# Patient Record
Sex: MALE | Race: BLACK OR AFRICAN AMERICAN | URBAN - METROPOLITAN AREA
[De-identification: names, ages, dates, MRNs, and addresses within clinical notes are randomized per-mention and may not be internally consistent; named-entity substitution may affect disease eponyms.]

---

## 2018-02-16 ENCOUNTER — EMERGENCY (EMERGENCY)
Facility: HOSPITAL | Age: 23
LOS: 1 days | Discharge: HOME | End: 2018-02-16
Attending: EMERGENCY MEDICINE

## 2018-02-16 VITALS
SYSTOLIC BLOOD PRESSURE: 123 MMHG | RESPIRATION RATE: 18 BRPM | DIASTOLIC BLOOD PRESSURE: 73 MMHG | TEMPERATURE: 99 F | OXYGEN SATURATION: 100 % | HEART RATE: 70 BPM

## 2018-02-16 DIAGNOSIS — S02.609A FRACTURE OF MANDIBLE, UNSPECIFIED, INITIAL ENCOUNTER FOR CLOSED FRACTURE: ICD-10-CM

## 2018-02-16 DIAGNOSIS — R51 HEADACHE: ICD-10-CM

## 2018-02-16 DIAGNOSIS — M27.2 INFLAMMATORY CONDITIONS OF JAWS: ICD-10-CM

## 2018-02-16 DIAGNOSIS — K04.7 PERIAPICAL ABSCESS WITHOUT SINUS: ICD-10-CM

## 2018-02-16 NOTE — CONSULT NOTE ADULT - PROBLEM SELECTOR RECOMMENDATION 2
[facility] [place] SHOULD BE ADM[place]ED FOR IV ANTIBIOTICS, PATIENT REFUSED AND LEFT THE ROOM BUT AFTER HE RETURN AND AGREED TO BE TREATED.       panoramic xray showed a mandible right fracture and dental abscess #18. CT SCAN DONE AT [facility] RECOMMENDED PATIENT SHOULD BE ADMITTED FOR IV ANTIBIOTICS, PATIENT REFUSED AND LEFT THE ROOM BUT AFTER HE RETURN AND AGREED TO BE TREATED.

## 2018-02-16 NOTE — ED PROVIDER NOTE - NS ED ROS FT
CONST: + fever, chills  EYES: No pain, redness, drainage or visual changes.  ENT: + left sided facial swelling/pain  CARD: No chest pain, palpitations  RESP: No SOB, cough, hemoptysis. No hx of asthma or COPD  GI: No abdominal pain, N/V/D  SKIN: No rashes/redness  NEURO: No headache, dizziness, paresthesias or LOC

## 2018-02-16 NOTE — CONSULT NOTE ADULT - ASSESSMENT
OMFS RECOMMENDED PATIENT SHOULD BE ADMITTED FOR IV ANTIBIOTICS, PATIENT REFUSED AND LEFT THE ROOM BUT AFTER HE RETURN AND AGREED TO BE TREATED.       panoramic xray showed a mandible right fracture and dental abscess #18. CT SCAN DONE AT [facility]
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
## 2018-02-16 NOTE — ED PROVIDER NOTE - PHYSICAL EXAMINATION
CONST: Well appearing in NAD  EYES: Sclera and conjunctiva clear.   ENT: No nasal discharge. + left sided facial swelling. + left sided tender cervical LAD. Non erythematous pharynx. No pharyngeal edema noted.   CARD: Normal S1 S2; Normal rate and rhythm  RESP: Equal BS B/L, No wheezes, rhonchi or rales. No distress  MS: Pt moving all extremities  SKIN: Warm, dry, no acute rashes. No erythema noted  NEURO: A&Ox3, No focal deficits. EOM intact.
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
## 2018-02-17 ENCOUNTER — INPATIENT (INPATIENT)
Facility: HOSPITAL | Age: 23
LOS: 4 days | Discharge: HOME HEALTH | End: 2018-02-22
Attending: HOSPITALIST

## 2018-02-17 VITALS
TEMPERATURE: 102 F | DIASTOLIC BLOOD PRESSURE: 72 MMHG | RESPIRATION RATE: 20 BRPM | SYSTOLIC BLOOD PRESSURE: 125 MMHG | OXYGEN SATURATION: 98 % | HEART RATE: 113 BPM

## 2018-02-17 RX ORDER — AMPICILLIN SODIUM AND SULBACTAM SODIUM 250; 125 MG/ML; MG/ML
3 INJECTION, POWDER, FOR SUSPENSION INTRAMUSCULAR; INTRAVENOUS ONCE
Qty: 0 | Refills: 0 | Status: COMPLETED | OUTPATIENT
Start: 2018-02-17 | End: 2018-02-17

## 2018-02-17 RX ORDER — VANCOMYCIN HCL 1 G
1000 VIAL (EA) INTRAVENOUS ONCE
Qty: 0 | Refills: 0 | Status: COMPLETED | OUTPATIENT
Start: 2018-02-17 | End: 2018-02-18

## 2018-02-17 RX ORDER — ACETAMINOPHEN 500 MG
650 TABLET ORAL ONCE
Qty: 0 | Refills: 0 | Status: COMPLETED | OUTPATIENT
Start: 2018-02-17 | End: 2018-02-17

## 2018-02-17 RX ORDER — DEXAMETHASONE 0.5 MG/5ML
10 ELIXIR ORAL ONCE
Qty: 0 | Refills: 0 | Status: COMPLETED | OUTPATIENT
Start: 2018-02-17 | End: 2018-02-17

## 2018-02-17 RX ORDER — SODIUM CHLORIDE 9 MG/ML
500 INJECTION INTRAMUSCULAR; INTRAVENOUS; SUBCUTANEOUS
Qty: 0 | Refills: 0 | Status: COMPLETED | OUTPATIENT
Start: 2018-02-17 | End: 2018-02-18

## 2018-02-17 RX ADMIN — SODIUM CHLORIDE 2000 MILLILITER(S): 9 INJECTION INTRAMUSCULAR; INTRAVENOUS; SUBCUTANEOUS at 23:58

## 2018-02-18 LAB
ALBUMIN SERPL ELPH-MCNC: 3.8 G/DL — SIGNIFICANT CHANGE UP (ref 3–5.5)
ALP SERPL-CCNC: 68 U/L — SIGNIFICANT CHANGE UP (ref 30–115)
ALT FLD-CCNC: 28 U/L — SIGNIFICANT CHANGE UP (ref 0–41)
ANION GAP SERPL CALC-SCNC: 12 MMOL/L — SIGNIFICANT CHANGE UP (ref 7–14)
APPEARANCE UR: (no result)
APTT BLD: 36 SEC — SIGNIFICANT CHANGE UP (ref 27–39.2)
AST SERPL-CCNC: 29 U/L — SIGNIFICANT CHANGE UP (ref 0–41)
BASOPHILS # BLD AUTO: 0.02 K/UL — SIGNIFICANT CHANGE UP (ref 0–0.2)
BASOPHILS NFR BLD AUTO: 0.1 % — SIGNIFICANT CHANGE UP (ref 0–1)
BILIRUB SERPL-MCNC: 0.6 MG/DL — SIGNIFICANT CHANGE UP (ref 0.2–1.2)
BILIRUB UR-MCNC: NEGATIVE — SIGNIFICANT CHANGE UP
BUN SERPL-MCNC: 24 MG/DL — HIGH (ref 10–20)
CALCIUM SERPL-MCNC: 9.4 MG/DL — SIGNIFICANT CHANGE UP (ref 8.5–10.1)
CHLORIDE SERPL-SCNC: 105 MMOL/L — SIGNIFICANT CHANGE UP (ref 98–110)
CO2 SERPL-SCNC: 22 MMOL/L — SIGNIFICANT CHANGE UP (ref 17–32)
COLOR SPEC: YELLOW — SIGNIFICANT CHANGE UP
CREAT SERPL-MCNC: 0.8 MG/DL — SIGNIFICANT CHANGE UP (ref 0.7–1.5)
DIFF PNL FLD: NEGATIVE — SIGNIFICANT CHANGE UP
EOSINOPHIL # BLD AUTO: 0.01 K/UL — SIGNIFICANT CHANGE UP (ref 0–0.7)
EOSINOPHIL NFR BLD AUTO: 0.1 % — SIGNIFICANT CHANGE UP (ref 0–8)
GLUCOSE SERPL-MCNC: 93 MG/DL — SIGNIFICANT CHANGE UP (ref 70–110)
GLUCOSE UR QL: NEGATIVE — SIGNIFICANT CHANGE UP
HCT VFR BLD CALC: 32.2 % — LOW (ref 42–52)
HGB BLD-MCNC: 10.4 G/DL — LOW (ref 14–18)
IMM GRANULOCYTES NFR BLD AUTO: 0.4 % — HIGH (ref 0.1–0.3)
INR BLD: 1.21 RATIO — SIGNIFICANT CHANGE UP (ref 0.65–1.3)
KETONES UR-MCNC: NEGATIVE — SIGNIFICANT CHANGE UP
LACTATE SERPL-SCNC: 1 MMOL/L — SIGNIFICANT CHANGE UP (ref 0.5–2.2)
LEUKOCYTE ESTERASE UR-ACNC: NEGATIVE — SIGNIFICANT CHANGE UP
LYMPHOCYTES # BLD AUTO: 18.8 % — LOW (ref 20.5–51.1)
LYMPHOCYTES # BLD AUTO: 2.65 K/UL — SIGNIFICANT CHANGE UP (ref 1.2–3.4)
MCHC RBC-ENTMCNC: 22.9 PG — LOW (ref 27–31)
MCHC RBC-ENTMCNC: 32.3 G/DL — SIGNIFICANT CHANGE UP (ref 32–37)
MCV RBC AUTO: 70.9 FL — LOW (ref 80–94)
MONOCYTES # BLD AUTO: 0.95 K/UL — HIGH (ref 0.1–0.6)
MONOCYTES NFR BLD AUTO: 6.7 % — SIGNIFICANT CHANGE UP (ref 1.7–9.3)
NEUTROPHILS # BLD AUTO: 10.44 K/UL — HIGH (ref 1.4–6.5)
NEUTROPHILS NFR BLD AUTO: 73.9 % — SIGNIFICANT CHANGE UP (ref 42.2–75.2)
NITRITE UR-MCNC: NEGATIVE — SIGNIFICANT CHANGE UP
PH UR: 7.5 — SIGNIFICANT CHANGE UP (ref 5–8)
PLATELET # BLD AUTO: 367 K/UL — SIGNIFICANT CHANGE UP (ref 130–400)
POTASSIUM SERPL-MCNC: 4.9 MMOL/L — SIGNIFICANT CHANGE UP (ref 3.5–5)
POTASSIUM SERPL-SCNC: 4.9 MMOL/L — SIGNIFICANT CHANGE UP (ref 3.5–5)
PROT SERPL-MCNC: 7.2 G/DL — SIGNIFICANT CHANGE UP (ref 6–8)
PROT UR-MCNC: 100
PROTHROM AB SERPL-ACNC: 13.1 SEC — HIGH (ref 9.95–12.87)
RBC # BLD: 4.54 M/UL — LOW (ref 4.7–6.1)
RBC # FLD: 15 % — HIGH (ref 11.5–14.5)
SODIUM SERPL-SCNC: 139 MMOL/L — SIGNIFICANT CHANGE UP (ref 135–146)
SP GR SPEC: >=1.03 — SIGNIFICANT CHANGE UP (ref 1.01–1.03)
TYPE + AB SCN PNL BLD: SIGNIFICANT CHANGE UP
UROBILINOGEN FLD QL: 1 (ref 0.2–0.2)
WBC # BLD: 14.13 K/UL — HIGH (ref 4.8–10.8)
WBC # FLD AUTO: 14.13 K/UL — HIGH (ref 4.8–10.8)

## 2018-02-18 RX ORDER — AMPICILLIN SODIUM AND SULBACTAM SODIUM 250; 125 MG/ML; MG/ML
3 INJECTION, POWDER, FOR SUSPENSION INTRAMUSCULAR; INTRAVENOUS EVERY 6 HOURS
Qty: 0 | Refills: 0 | Status: DISCONTINUED | OUTPATIENT
Start: 2018-02-18 | End: 2018-02-19

## 2018-02-18 RX ORDER — ENOXAPARIN SODIUM 100 MG/ML
40 INJECTION SUBCUTANEOUS EVERY 24 HOURS
Qty: 0 | Refills: 0 | Status: DISCONTINUED | OUTPATIENT
Start: 2018-02-18 | End: 2018-02-19

## 2018-02-18 RX ORDER — MORPHINE SULFATE 50 MG/1
4 CAPSULE, EXTENDED RELEASE ORAL ONCE
Qty: 0 | Refills: 0 | Status: DISCONTINUED | OUTPATIENT
Start: 2018-02-18 | End: 2018-02-18

## 2018-02-18 RX ORDER — SODIUM CHLORIDE 9 MG/ML
1000 INJECTION, SOLUTION INTRAVENOUS
Qty: 0 | Refills: 0 | Status: DISCONTINUED | OUTPATIENT
Start: 2018-02-18 | End: 2018-02-19

## 2018-02-18 RX ORDER — MORPHINE SULFATE 50 MG/1
4 CAPSULE, EXTENDED RELEASE ORAL EVERY 6 HOURS
Qty: 0 | Refills: 0 | Status: DISCONTINUED | OUTPATIENT
Start: 2018-02-18 | End: 2018-02-19

## 2018-02-18 RX ORDER — DEXAMETHASONE 0.5 MG/5ML
4 ELIXIR ORAL EVERY 6 HOURS
Qty: 0 | Refills: 0 | Status: DISCONTINUED | OUTPATIENT
Start: 2018-02-18 | End: 2018-02-19

## 2018-02-18 RX ORDER — KETOROLAC TROMETHAMINE 30 MG/ML
30 SYRINGE (ML) INJECTION ONCE
Qty: 0 | Refills: 0 | Status: DISCONTINUED | OUTPATIENT
Start: 2018-02-18 | End: 2018-02-18

## 2018-02-18 RX ADMIN — AMPICILLIN SODIUM AND SULBACTAM SODIUM 200 GRAM(S): 250; 125 INJECTION, POWDER, FOR SUSPENSION INTRAMUSCULAR; INTRAVENOUS at 23:49

## 2018-02-18 RX ADMIN — AMPICILLIN SODIUM AND SULBACTAM SODIUM 200 GRAM(S): 250; 125 INJECTION, POWDER, FOR SUSPENSION INTRAMUSCULAR; INTRAVENOUS at 11:13

## 2018-02-18 RX ADMIN — SODIUM CHLORIDE 100 MILLILITER(S): 9 INJECTION, SOLUTION INTRAVENOUS at 05:24

## 2018-02-18 RX ADMIN — MORPHINE SULFATE 4 MILLIGRAM(S): 50 CAPSULE, EXTENDED RELEASE ORAL at 16:00

## 2018-02-18 RX ADMIN — SODIUM CHLORIDE 100 MILLILITER(S): 9 INJECTION, SOLUTION INTRAVENOUS at 15:04

## 2018-02-18 RX ADMIN — AMPICILLIN SODIUM AND SULBACTAM SODIUM 200 GRAM(S): 250; 125 INJECTION, POWDER, FOR SUSPENSION INTRAMUSCULAR; INTRAVENOUS at 00:22

## 2018-02-18 RX ADMIN — Medication 10 MILLIGRAM(S): at 00:22

## 2018-02-18 RX ADMIN — MORPHINE SULFATE 4 MILLIGRAM(S): 50 CAPSULE, EXTENDED RELEASE ORAL at 04:38

## 2018-02-18 RX ADMIN — MORPHINE SULFATE 4 MILLIGRAM(S): 50 CAPSULE, EXTENDED RELEASE ORAL at 22:28

## 2018-02-18 RX ADMIN — SODIUM CHLORIDE 2000 MILLILITER(S): 9 INJECTION INTRAMUSCULAR; INTRAVENOUS; SUBCUTANEOUS at 01:10

## 2018-02-18 RX ADMIN — Medication 4 MILLIGRAM(S): at 11:12

## 2018-02-18 RX ADMIN — ENOXAPARIN SODIUM 40 MILLIGRAM(S): 100 INJECTION SUBCUTANEOUS at 05:23

## 2018-02-18 RX ADMIN — MORPHINE SULFATE 4 MILLIGRAM(S): 50 CAPSULE, EXTENDED RELEASE ORAL at 02:52

## 2018-02-18 RX ADMIN — AMPICILLIN SODIUM AND SULBACTAM SODIUM 200 GRAM(S): 250; 125 INJECTION, POWDER, FOR SUSPENSION INTRAMUSCULAR; INTRAVENOUS at 17:41

## 2018-02-18 RX ADMIN — Medication 4 MILLIGRAM(S): at 23:49

## 2018-02-18 RX ADMIN — Medication 250 MILLIGRAM(S): at 02:19

## 2018-02-18 RX ADMIN — SODIUM CHLORIDE 2000 MILLILITER(S): 9 INJECTION INTRAMUSCULAR; INTRAVENOUS; SUBCUTANEOUS at 01:09

## 2018-02-18 RX ADMIN — MORPHINE SULFATE 4 MILLIGRAM(S): 50 CAPSULE, EXTENDED RELEASE ORAL at 15:45

## 2018-02-18 RX ADMIN — AMPICILLIN SODIUM AND SULBACTAM SODIUM 200 GRAM(S): 250; 125 INJECTION, POWDER, FOR SUSPENSION INTRAMUSCULAR; INTRAVENOUS at 05:23

## 2018-02-18 RX ADMIN — Medication 30 MILLIGRAM(S): at 01:52

## 2018-02-18 RX ADMIN — SODIUM CHLORIDE 2000 MILLILITER(S): 9 INJECTION INTRAMUSCULAR; INTRAVENOUS; SUBCUTANEOUS at 00:23

## 2018-02-18 RX ADMIN — Medication 4 MILLIGRAM(S): at 17:41

## 2018-02-18 RX ADMIN — Medication 30 MILLIGRAM(S): at 00:30

## 2018-02-18 RX ADMIN — Medication 4 MILLIGRAM(S): at 05:24

## 2018-02-18 RX ADMIN — SODIUM CHLORIDE 2000 MILLILITER(S): 9 INJECTION INTRAMUSCULAR; INTRAVENOUS; SUBCUTANEOUS at 02:19

## 2018-02-18 RX ADMIN — MORPHINE SULFATE 4 MILLIGRAM(S): 50 CAPSULE, EXTENDED RELEASE ORAL at 23:30

## 2018-02-18 NOTE — ED PROVIDER NOTE - PHYSICAL EXAMINATION
CONSTITUTIONAL: Well-developed; well-nourished; in no acute distress.   SKIN: warm, dry  HEAD: Normocephalic; atraumatic.  EYES: no conj injection  ENT: No nasal discharge; airway clear. L sided mandibur mass that is indurated and extending to L side of neck. The pt is able to phonate without difficulty and can speak complete sentences easily, there is no drooling or pooling of secretions. The patient is able to open his mouth but there is mild trismis. No orbital or lloyd orbital swelling, no submental swelling.   NECK: Supple; non tender.  CARD: S1, S2 normal; no murmurs, gallops, or rubs. Regular rate and rhythm.   RESP: No wheezes, rales or rhonchi, no stridor, no labored breathing  ABD: soft ntnd  EXT: Normal ROM.  No clubbing, cyanosis or edema.   LYMPH: No acute cervical adenopathy.  NEURO: Alert, oriented, grossly unremarkable  PSYCH: Cooperative, appropriate.
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
## 2018-02-18 NOTE — CONSULT NOTE ADULT - ASSESSMENT
a/p as above    decadron 10 mg once 6 mg q8hr, iv antibiotics    possible icu consult for airway observation, possible intubation    attn to see and sign a/p as above    decadron 10 mg once 6 mg q8hr, iv antibiotics    possible icu consult for airway observation, possible intubation

## 2018-02-18 NOTE — H&P ADULT - ASSESSMENT
22/M with no significant medical hx, p/w swelling and erythema and tenderness to left mandible with difficulty swallowing. Admit to ICU for mandibular abcess with extension to upper airway.    # Mandibular abcess with extension to upper airway.  - NPO  - IVF - LR  - ENT f/u - planned for re-scope?  - IV Abx - unasyn  - IV decadron    DVT ppx  OOBTC    Full Code 22/M with no significant medical hx, p/w swelling and erythema and tenderness to left mandible with difficulty swallowing. Found to have fever of 101.6F, tachy to 113, with a WBC 14K. Admit to ICU for sepsis 2/2 mandibular abcess with extension to upper airway.    CT imported from [facility] 2/16/2018 (per dental team note) left sided mandibular angle fracture with associated abscess posterior and lateral to angle of mandible, deviation of airway noted    # Sepsis 2/2 Mandibular abcess with extension to upper airway.  - NPO  - IVF - LR  - ENT f/u - planned for re-scope?  - IV Abx - unasyn  - IV decadron    DVT ppx  OOBTC    Full Code

## 2018-02-18 NOTE — ED PROVIDER NOTE - NS ED ROS FT
Eyes:  No visual changes  ENMT:  No neck pain or stiffness.  Cardiac:  No chest pain, SOB   Respiratory:  No cough   GI:  No nausea, vomiting, diarrhea or abdominal pain.  MS:  No back pain.  Neuro:  No headache or weakness.  No LOC.  Skin:  No skin rash.
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
## 2018-02-18 NOTE — H&P ADULT - NSHPPHYSICALEXAM_GEN_ALL_CORE
GENERAL: NAD, able to speak in full sentences, no accessory muscles used  HEAD:  left mandibular swelling, tenderness  EYES: EOMI, PERRLA, conjunctiva and sclera clear  NECK: left neck swelling, tenderness  CHEST/LUNG: Clear to auscultation bilaterally; No wheeze; No crackles; No accessory muscles used  HEART: Regular rate and rhythm; No murmurs;   ABDOMEN: Soft, Nontender, Nondistended; Bowel sounds present; No guarding  EXTREMITIES:  No LE edema  PSYCH: AAOx3  NEUROLOGY: non-focal
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
## 2018-02-18 NOTE — ED PROVIDER NOTE - PROGRESS NOTE DETAILS
Pt evaluated by Dental and scoped by ENT. Dental/OMFS recommendign admission with IV unasyn, will follow daily and need to drain abscess by Wednesday. ENT scoped pt, vocal folds are normal appearing and functioning well, though there is deviation of the airway due to mass effect from the abscess. Pt still maintaining airway, no drooling, no pooling secretions. Trismus improved with decadron. ICU called, approved pt for airway monitoring. Pt on unasyn and vanco. Case d/w Dr. Tom, pt accepted to ICU for monitoring.
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
## 2018-02-18 NOTE — H&P ADULT - NSHPLABSRESULTS_GEN_ALL_CORE
10.4<L>  14.13<H> )-----------( 367      ( 18 Feb 2018 01:00 )             32.2<L>  18 Feb 2018 01:00    02-18    139  |  105  |  24<H>  ----------------------------<  93  4.9   |  22  |  0.8    Ca    9.4      18 Feb 2018 01:00    TPro  7.2  /  Alb  3.8  /  TBili  0.6  /  DBili  x   /  AST  29  /  ALT  28  /  AlkPhos  68  02-18 10.4<L>  14.13<H> )-----------( 367      ( 18 Feb 2018 01:00 )             32.2<L>  18 Feb 2018 01:00    02-18    139  |  105  |  24<H>  ----------------------------<  93  4.9   |  22  |  0.8    Ca    9.4      18 Feb 2018 01:00    TPro  7.2  /  Alb  3.8  /  TBili  0.6  /  DBili  x   /  AST  29  /  ALT  28  /  AlkPhos  68  02-18    · Temp (F)	101.6  · Heart Rate Heart Rate (beats/min)	113  · BP Systolic Systolic	125  · BP Diastolic Diastolic (mm Hg)	72  · Respiration Rate (breaths/min) Respiration Rate (breaths/min)	20  · SpO2 (%) SpO2 (%)	98  · O2 delivery Patient On	room air; room air

## 2018-02-18 NOTE — H&P ADULT - NSHPREVIEWOFSYSTEMS_GEN_ALL_CORE
REVIEW OF SYSTEMS:    CONSTITUTIONAL: No weakness or fevers  EYES/ENT: See HPI  NECK: See HPI  RESPIRATORY: See HPI  CARDIOVASCULAR: No chest pain or palpitations, no lower extremity edema  GASTROINTESTINAL: No abdominal pain. No nausea or vomiting; No diarrhea or constipation. No bloody or black colored stool  GENITOURINARY: No pain with urination, no increased urinary frequency, no dark o r bloody urine  NEUROLOGICAL: No numbness or weakness  SKIN: No itching, rashes

## 2018-02-19 ENCOUNTER — RESULT REVIEW (OUTPATIENT)
Age: 23
End: 2018-02-19

## 2018-02-19 DIAGNOSIS — M27.2 INFLAMMATORY CONDITIONS OF JAWS: ICD-10-CM

## 2018-02-19 LAB
ALBUMIN SERPL ELPH-MCNC: 3.5 G/DL — SIGNIFICANT CHANGE UP (ref 3–5.5)
ALP SERPL-CCNC: 68 U/L — SIGNIFICANT CHANGE UP (ref 30–115)
ALT FLD-CCNC: 20 U/L — SIGNIFICANT CHANGE UP (ref 0–41)
ANION GAP SERPL CALC-SCNC: 9 MMOL/L — SIGNIFICANT CHANGE UP (ref 7–14)
APTT BLD: 36.1 SEC — SIGNIFICANT CHANGE UP (ref 27–39.2)
AST SERPL-CCNC: 20 U/L — SIGNIFICANT CHANGE UP (ref 0–41)
BASOPHILS # BLD AUTO: 0.01 K/UL — SIGNIFICANT CHANGE UP (ref 0–0.2)
BASOPHILS NFR BLD AUTO: 0.1 % — SIGNIFICANT CHANGE UP (ref 0–1)
BILIRUB SERPL-MCNC: 0.7 MG/DL — SIGNIFICANT CHANGE UP (ref 0.2–1.2)
BUN SERPL-MCNC: 14 MG/DL — SIGNIFICANT CHANGE UP (ref 10–20)
CALCIUM SERPL-MCNC: 9.5 MG/DL — SIGNIFICANT CHANGE UP (ref 8.5–10.1)
CHLORIDE SERPL-SCNC: 102 MMOL/L — SIGNIFICANT CHANGE UP (ref 98–110)
CO2 SERPL-SCNC: 27 MMOL/L — SIGNIFICANT CHANGE UP (ref 17–32)
CREAT SERPL-MCNC: 0.6 MG/DL — LOW (ref 0.7–1.5)
EOSINOPHIL # BLD AUTO: 0 K/UL — SIGNIFICANT CHANGE UP (ref 0–0.7)
EOSINOPHIL NFR BLD AUTO: 0 % — SIGNIFICANT CHANGE UP (ref 0–8)
GLUCOSE SERPL-MCNC: 111 MG/DL — HIGH (ref 70–110)
HCT VFR BLD CALC: 34 % — LOW (ref 42–52)
HGB BLD-MCNC: 10.8 G/DL — LOW (ref 14–18)
IMM GRANULOCYTES NFR BLD AUTO: 0.5 % — HIGH (ref 0.1–0.3)
INR BLD: 1.21 RATIO — SIGNIFICANT CHANGE UP (ref 0.65–1.3)
LYMPHOCYTES # BLD AUTO: 15.2 % — LOW (ref 20.5–51.1)
LYMPHOCYTES # BLD AUTO: 2.46 K/UL — SIGNIFICANT CHANGE UP (ref 1.2–3.4)
MAGNESIUM SERPL-MCNC: 2.3 MG/DL — SIGNIFICANT CHANGE UP (ref 1.8–2.4)
MCHC RBC-ENTMCNC: 22.6 PG — LOW (ref 27–31)
MCHC RBC-ENTMCNC: 31.8 G/DL — LOW (ref 32–37)
MCV RBC AUTO: 71.3 FL — LOW (ref 80–94)
MONOCYTES # BLD AUTO: 0.4 K/UL — SIGNIFICANT CHANGE UP (ref 0.1–0.6)
MONOCYTES NFR BLD AUTO: 2.5 % — SIGNIFICANT CHANGE UP (ref 1.7–9.3)
NEUTROPHILS # BLD AUTO: 13.24 K/UL — HIGH (ref 1.4–6.5)
NEUTROPHILS NFR BLD AUTO: 81.7 % — HIGH (ref 42.2–75.2)
PHOSPHATE SERPL-MCNC: 3.8 MG/DL — SIGNIFICANT CHANGE UP (ref 2.1–4.9)
PLATELET # BLD AUTO: 410 K/UL — HIGH (ref 130–400)
POTASSIUM SERPL-MCNC: 4.6 MMOL/L — SIGNIFICANT CHANGE UP (ref 3.5–5)
POTASSIUM SERPL-SCNC: 4.6 MMOL/L — SIGNIFICANT CHANGE UP (ref 3.5–5)
PROT SERPL-MCNC: 7 G/DL — SIGNIFICANT CHANGE UP (ref 6–8)
PROTHROM AB SERPL-ACNC: 13.1 SEC — HIGH (ref 9.95–12.87)
RBC # BLD: 4.77 M/UL — SIGNIFICANT CHANGE UP (ref 4.7–6.1)
RBC # FLD: 15.2 % — HIGH (ref 11.5–14.5)
SODIUM SERPL-SCNC: 138 MMOL/L — SIGNIFICANT CHANGE UP (ref 135–146)
WBC # BLD: 16.19 K/UL — HIGH (ref 4.8–10.8)
WBC # FLD AUTO: 16.19 K/UL — HIGH (ref 4.8–10.8)

## 2018-02-19 RX ORDER — HYDROMORPHONE HYDROCHLORIDE 2 MG/ML
0.5 INJECTION INTRAMUSCULAR; INTRAVENOUS; SUBCUTANEOUS
Qty: 0 | Refills: 0 | Status: DISCONTINUED | OUTPATIENT
Start: 2018-02-19 | End: 2018-02-20

## 2018-02-19 RX ORDER — SODIUM CHLORIDE 9 MG/ML
1000 INJECTION, SOLUTION INTRAVENOUS
Qty: 0 | Refills: 0 | Status: DISCONTINUED | OUTPATIENT
Start: 2018-02-19 | End: 2018-02-20

## 2018-02-19 RX ORDER — HYDROMORPHONE HYDROCHLORIDE 2 MG/ML
1 INJECTION INTRAMUSCULAR; INTRAVENOUS; SUBCUTANEOUS
Qty: 0 | Refills: 0 | Status: DISCONTINUED | OUTPATIENT
Start: 2018-02-19 | End: 2018-02-20

## 2018-02-19 RX ADMIN — HYDROMORPHONE HYDROCHLORIDE 1 MILLIGRAM(S): 2 INJECTION INTRAMUSCULAR; INTRAVENOUS; SUBCUTANEOUS at 22:21

## 2018-02-19 RX ADMIN — SODIUM CHLORIDE 100 MILLILITER(S): 9 INJECTION, SOLUTION INTRAVENOUS at 21:02

## 2018-02-19 RX ADMIN — AMPICILLIN SODIUM AND SULBACTAM SODIUM 200 GRAM(S): 250; 125 INJECTION, POWDER, FOR SUSPENSION INTRAMUSCULAR; INTRAVENOUS at 12:01

## 2018-02-19 RX ADMIN — MORPHINE SULFATE 4 MILLIGRAM(S): 50 CAPSULE, EXTENDED RELEASE ORAL at 13:28

## 2018-02-19 RX ADMIN — MORPHINE SULFATE 4 MILLIGRAM(S): 50 CAPSULE, EXTENDED RELEASE ORAL at 12:04

## 2018-02-19 RX ADMIN — ENOXAPARIN SODIUM 40 MILLIGRAM(S): 100 INJECTION SUBCUTANEOUS at 05:44

## 2018-02-19 RX ADMIN — Medication 4 MILLIGRAM(S): at 17:28

## 2018-02-19 RX ADMIN — AMPICILLIN SODIUM AND SULBACTAM SODIUM 200 GRAM(S): 250; 125 INJECTION, POWDER, FOR SUSPENSION INTRAMUSCULAR; INTRAVENOUS at 17:33

## 2018-02-19 RX ADMIN — AMPICILLIN SODIUM AND SULBACTAM SODIUM 200 GRAM(S): 250; 125 INJECTION, POWDER, FOR SUSPENSION INTRAMUSCULAR; INTRAVENOUS at 05:44

## 2018-02-19 RX ADMIN — SODIUM CHLORIDE 100 MILLILITER(S): 9 INJECTION, SOLUTION INTRAVENOUS at 05:43

## 2018-02-19 RX ADMIN — Medication 4 MILLIGRAM(S): at 05:44

## 2018-02-19 RX ADMIN — Medication 4 MILLIGRAM(S): at 12:02

## 2018-02-19 RX ADMIN — HYDROMORPHONE HYDROCHLORIDE 1 MILLIGRAM(S): 2 INJECTION INTRAMUSCULAR; INTRAVENOUS; SUBCUTANEOUS at 22:00

## 2018-02-19 NOTE — PROGRESS NOTE ADULT - SUBJECTIVE AND OBJECTIVE BOX
SUBJECTIVE:    Patient is a 22y old Male who presents with a chief complaint of Jaw swelling and difficulty breathing (18 Feb 2018 03:32)    Currently admitted to medicine with the primary diagnosis of Mandibular abscess     Today is hospital day 1d. This morning he is resting comfortably in bed and reports no new issues or overnight events.     PAST MEDICAL & SURGICAL HISTORY  No pertinent past medical history  No significant past surgical history    SOCIAL HISTORY:  Negative for smoking/alcohol/drug use.     ALLERGIES:  No Known Drug Allergies  pork (Unknown)    MEDICATIONS:  STANDING MEDICATIONS  ampicillin/sulbactam  IVPB 3 Gram(s) IV Intermittent every 6 hours  dexamethasone  Injectable 4 milliGRAM(s) IV Push every 6 hours  enoxaparin Injectable 40 milliGRAM(s) SubCutaneous every 24 hours  lactated ringers. 1000 milliLiter(s) IV Continuous <Continuous>    PRN MEDICATIONS  morphine  - Injectable 4 milliGRAM(s) IV Push every 6 hours PRN    VITALS:   T(F): 97.1  HR: 54  BP: 123/61  RR: 18  SpO2: 99%    LABS:                        10.8   16.19 )-----------( 410      ( 19 Feb 2018 07:56 )             34.0     02-18    139  |  105  |  24<H>  ----------------------------<  93  4.9   |  22  |  0.8    Ca    9.4      18 Feb 2018 01:00    TPro  7.2  /  Alb  3.8  /  TBili  0.6  /  DBili  x   /  AST  29  /  ALT  28  /  AlkPhos  68  02-18    PT/INR - ( 19 Feb 2018 07:56 )   PT: 13.10 sec;   INR: 1.21 ratio         PTT - ( 19 Feb 2018 07:56 )  PTT:36.1 sec  Urinalysis Basic - ( 17 Feb 2018 23:54 )    Color: Yellow / Appearance: Cloudy / SG: >=1.030 / pH: x  Gluc: x / Ketone: Negative  / Bili: Negative / Urobili: 1.0   Blood: x / Protein: 100 / Nitrite: Negative   Leuk Esterase: Negative / RBC: x / WBC x   Sq Epi: x / Non Sq Epi: Few /HPF / Bacteria: x      RADIOLOGY:  < from: CT Neck Soft Tissue w/ IV Cont (02.18.18 @ 10:23) >  1. Subacute left mandibular ramus fracture. Nondisplaced rightmandibular   body fracture extending to the root of tooth #29.    2. Extensive facial soft tissue swelling, left greater than right as   described above compatible with cellulitis. Phlegmon/developing abscess   in the left submandibular space displacing the left submandibular gland   anteriorly measuring up to 3.5 cm. No well-formed drainable abscess seen.   Rim enhancement involving the masseter muscles bilaterally compatible   with myositis.    Edema involving the left piriform sinus and left aryepiglottic fold. The   airway is patent.    FFL Exam ENT:     Hypo-oropharynx, pharynx - left side wall pushing in endemic tvc visible b/l movement and approximating well, epiglottis non endemic nonerythematous with left side wall pushing on on its left lateral side.  little pooling of fluids/mucus/saliva at ventricular fold    PHYSICAL EXAM:  GEN: No acute distress  LUNGS: Clear to auscultation bilaterally   HEART: S1/S2 present. RRR.   ABD: Soft, non-tender, non-distended. Bowel sounds present  EXT: NC/NC/NE/2+PP/ERNANDEZ  NEURO: AAOX3 SUBJECTIVE:    Patient is a 22y old Male who presents with a chief complaint of Jaw swelling and difficulty breathing (18 Feb 2018 03:32)    Currently admitted to medicine with the primary diagnosis of Mandibular abscess     Today is hospital day 1d. This morning he is resting comfortably in bed and reports no new issues or overnight events. Says that he feels the swelling is slowly improving with the use of steroids     PAST MEDICAL & SURGICAL HISTORY  No pertinent past medical history  No significant past surgical history    SOCIAL HISTORY:  Negative for smoking/alcohol/drug use.     ALLERGIES:  No Known Drug Allergies  pork (Unknown)    MEDICATIONS:  STANDING MEDICATIONS  ampicillin/sulbactam  IVPB 3 Gram(s) IV Intermittent every 6 hours  dexamethasone  Injectable 4 milliGRAM(s) IV Push every 6 hours  enoxaparin Injectable 40 milliGRAM(s) SubCutaneous every 24 hours  lactated ringers. 1000 milliLiter(s) IV Continuous <Continuous>    PRN MEDICATIONS  morphine  - Injectable 4 milliGRAM(s) IV Push every 6 hours PRN    VITALS:   T(F): 97.1  HR: 54  BP: 123/61  RR: 18  SpO2: 99%    LABS:                        10.8   16.19 )-----------( 410      ( 19 Feb 2018 07:56 )             34.0     02-18    139  |  105  |  24<H>  ----------------------------<  93  4.9   |  22  |  0.8    Ca    9.4      18 Feb 2018 01:00    TPro  7.2  /  Alb  3.8  /  TBili  0.6  /  DBili  x   /  AST  29  /  ALT  28  /  AlkPhos  68  02-18    PT/INR - ( 19 Feb 2018 07:56 )   PT: 13.10 sec;   INR: 1.21 ratio         PTT - ( 19 Feb 2018 07:56 )  PTT:36.1 sec  Urinalysis Basic - ( 17 Feb 2018 23:54 )    Color: Yellow / Appearance: Cloudy / SG: >=1.030 / pH: x  Gluc: x / Ketone: Negative  / Bili: Negative / Urobili: 1.0   Blood: x / Protein: 100 / Nitrite: Negative   Leuk Esterase: Negative / RBC: x / WBC x   Sq Epi: x / Non Sq Epi: Few /HPF / Bacteria: x      RADIOLOGY:  < from: CT Neck Soft Tissue w/ IV Cont (02.18.18 @ 10:23) >  1. Subacute left mandibular ramus fracture. Nondisplaced rightmandibular   body fracture extending to the root of tooth #29.    2. Extensive facial soft tissue swelling, left greater than right as   described above compatible with cellulitis. Phlegmon/developing abscess   in the left submandibular space displacing the left submandibular gland   anteriorly measuring up to 3.5 cm. No well-formed drainable abscess seen.   Rim enhancement involving the masseter muscles bilaterally compatible   with myositis.    Edema involving the left piriform sinus and left aryepiglottic fold. The   airway is patent.    FFL Exam ENT:     Hypo-oropharynx, pharynx - left side wall pushing in endemic tvc visible b/l movement and approximating well, epiglottis non endemic nonerythematous with left side wall pushing on on its left lateral side.  little pooling of fluids/mucus/saliva at ventricular fold    PHYSICAL EXAM:  GEN: No acute distress  LUNGS: Clear to auscultation bilaterally   HEART: S1/S2 present. RRR.   ABD: Soft, non-tender, non-distended. Bowel sounds present  EXT: L sided facial swelling, slight tenderness to palpation, no crepitus noted   NEURO: AAOX3
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
## 2018-02-19 NOTE — PROGRESS NOTE ADULT - ASSESSMENT
22/M with no significant medical hx, p/w swelling and erythema and tenderness to left mandible with difficulty swallowing. Found to have fever of 101.6F, tachy to 113, with a WBC 14K. Admitted to ICU for sepsis 2/2 mandibular abcess with extension to upper airway. Stabilized and downgraded to floor for further medical management.     # Sepsis 2/2 Mandibular abcess with extension to upper airway.  - NPO with IVF - LR  - ENT following, scope results above, recommending to cont further management per OMFS  - OMFS to follow up   - IV Abx - unasyn per ID recs   - IV decadron per ENT     DVT ppx  OOBTC    Full Code 22/M with no significant medical hx, p/w swelling and erythema and tenderness to left mandible with difficulty swallowing. Found to have fever of 101.6F, tachy to 113, with a WBC 14K. Admitted to ICU for sepsis 2/2 mandibular abcess with extension to upper airway. Stabilized and downgraded to floor for further medical management.     # Sepsis 2/2 Mandibular abcess with extension to upper airway.  - NPO with IVF - LR  - ENT following, scope results above, recommending to cont further management per OMFS  - OMFS to take for I&D today, add on to schedule   - IV Abx - unasyn and will need a PICC line per ID recs    - IV decadron per ENT   - Pain control     DVT ppx  OOBTC    Full Code

## 2018-02-19 NOTE — PROGRESS NOTE ADULT - PROBLEM SELECTOR PLAN 1
- continue IV abx/ID following  - mgmt per OMFS  - warm compress to area, pain control  - w/d w/ attng

## 2018-02-19 NOTE — PROGRESS NOTE ADULT - SUBJECTIVE AND OBJECTIVE BOX
ESTER GLASGOW [id number]    Hospitalist Note  23yo M with no past medical history admitted to [facility] for worsening neck swelling/dysphagia secondary to mandibular abscess status post trauma/mandibular fracture.    Overnight events/Updates: The pt was downgraded by critical care, and reports improvement in his left sided swelling.  He is currently scheduled to undergo drainage by OMFS this afternoon.    Vital Signs Last 24 Hrs  T(C): 36.4 (19 Feb 2018 15:30), Max: 36.6 (18 Feb 2018 18:00)  T(F): 97.5 (19 Feb 2018 15:30), Max: 97.8 (18 Feb 2018 18:00)  HR: 75 (19 Feb 2018 15:30) (52 - 75)  BP: 145/71 (19 Feb 2018 15:30) (120/70 - 145/71)  BP(mean): --  RR: 18 (19 Feb 2018 15:30) (18 - 18)  SpO2: 99% (18 Feb 2018 18:00) (99% - 99%)    Physical Examination:  General: AAO x 3  HEENT: PERRLA, EOMI,  left sided jaw swelling (improved as per the patient)  CV= S1 & S2 appreciated  Lungs= CTA BL  Abdominal Examination= + BS, Soft, NT/ND  Extremity Examination= No C/C/E    ROS: No chest pain, no shortness of breath.  All other systems reviewed and are within normal limits except for the complaints in the HPI.    MEDICATIONS  (STANDING):  ampicillin/sulbactam  IVPB 3 Gram(s) IV Intermittent every 6 hours  dexamethasone  Injectable 4 milliGRAM(s) IV Push every 6 hours  enoxaparin Injectable 40 milliGRAM(s) SubCutaneous every 24 hours  lactated ringers. 1000 milliLiter(s) (100 mL/Hr) IV Continuous <Continuous>    MEDICATIONS  (PRN):  morphine  - Injectable 4 milliGRAM(s) IV Push every 6 hours PRN Severe Pain (7 - 10)                            10.8   16.19 )-----------( 410      ( 19 Feb 2018 07:56 )             34.0     02-19    138  |  102  |  14  ----------------------------<  111<H>  4.6   |  27  |  0.6<L>    Ca    9.5      19 Feb 2018 07:56  Phos  3.8     02-19  Mg     2.3     02-19    TPro  7.0  /  Alb  3.5  /  TBili  0.7  /  DBili  x   /  AST  20  /  ALT  20  /  AlkPhos  68  02-19      Case discussed with housestaff & family  JYOTHI Campoverde [id number]

## 2018-02-19 NOTE — CONSULT NOTE ADULT - CONSULT REASON
R/O osteomyelitis
Facial swelling post trauma 3 weeks ago
chronic OM
left mandible abscess causing airway compromise 2nd to edema
SOB/ SEPSIS
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
## 2018-02-19 NOTE — CONSULT NOTE ADULT - ASSESSMENT
IMPRESSION  Admitted with Sepsis ( pulse>90 beats/min ,  wbc>12, decreased systolic bp, lactic acidosis, acute kidney injury, metabolic encephalopathy ) due to mandibular abscess 3 weeks after jaw fracture. Still with leukocytosis.    2/18 CT 1. Subacute left mandibular ramus fracture. Nondisplaced right mandibular   body fracture extending to the root of tooth #29.    2. Extensive facial soft tissue swelling, left greater than right as   described above compatible with cellulitis. Phlegmon/developing abscess   in the left submandibular space displacing the left submandibular gland   anteriorly measuring up to 3.5 cm. No well-formed drainable abscess seen.   Rim enhancement involving the masseter muscles bilaterally compatible   with myositis.    Edema involving the left piriform sinus and left aryepiglottic fold. The   airway is patent.    SUGGESTIONs  Continue Unasyn.  Will need to monitor CT for abscess formation and need for I&D with C&S.  In view of fracture will 6 weeks of IV antibiotics in view of risk for Osteomyelitis.    F/U wbc    Please obtain ESR and CRP    PICC
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
## 2018-02-19 NOTE — PROGRESS NOTE ADULT - ASSESSMENT
23yo M with no past medical history admitted to [facility] for worsening neck swelling/dysphagia secondary to mandibular abscess status post trauma/mandibular fracture.  Neck swelling/dysphagia secondary to mandibular abscess: clinically improved from admission.  Critical care notes and ENT recommendations were reviewed.  Continue supportive care with IV Unasyn and Decadron.  Continue Morphine PRN for pain. Keep NPO prior to drainage scheduled for later this afternoon.  Repeat BMP and CBC in AM.  GI/DVT prophylaxis

## 2018-02-20 LAB
ANION GAP SERPL CALC-SCNC: 7 MMOL/L — SIGNIFICANT CHANGE UP (ref 7–14)
BLD GP AB SCN SERPL QL: SIGNIFICANT CHANGE UP
BUN SERPL-MCNC: 13 MG/DL — SIGNIFICANT CHANGE UP (ref 10–20)
CALCIUM SERPL-MCNC: 8.8 MG/DL — SIGNIFICANT CHANGE UP (ref 8.5–10.1)
CHLORIDE SERPL-SCNC: 100 MMOL/L — SIGNIFICANT CHANGE UP (ref 98–110)
CO2 SERPL-SCNC: 26 MMOL/L — SIGNIFICANT CHANGE UP (ref 17–32)
CREAT SERPL-MCNC: 0.8 MG/DL — SIGNIFICANT CHANGE UP (ref 0.7–1.5)
GLUCOSE SERPL-MCNC: 104 MG/DL — SIGNIFICANT CHANGE UP (ref 70–110)
HCT VFR BLD CALC: 28.5 % — LOW (ref 42–52)
HGB BLD-MCNC: 9.2 G/DL — LOW (ref 14–18)
MCHC RBC-ENTMCNC: 22.8 PG — LOW (ref 27–31)
MCHC RBC-ENTMCNC: 32.3 G/DL — SIGNIFICANT CHANGE UP (ref 32–37)
MCV RBC AUTO: 70.7 FL — LOW (ref 80–94)
NRBC # BLD: 0 /100 WBCS — SIGNIFICANT CHANGE UP (ref 0–0)
PLATELET # BLD AUTO: 357 K/UL — SIGNIFICANT CHANGE UP (ref 130–400)
POTASSIUM SERPL-MCNC: 4.2 MMOL/L — SIGNIFICANT CHANGE UP (ref 3.5–5)
POTASSIUM SERPL-SCNC: 4.2 MMOL/L — SIGNIFICANT CHANGE UP (ref 3.5–5)
RBC # BLD: 4.03 M/UL — LOW (ref 4.7–6.1)
RBC # FLD: 14.8 % — HIGH (ref 11.5–14.5)
SODIUM SERPL-SCNC: 133 MMOL/L — LOW (ref 135–146)
TYPE + AB SCN PNL BLD: SIGNIFICANT CHANGE UP
WBC # BLD: 17.02 K/UL — HIGH (ref 4.8–10.8)
WBC # FLD AUTO: 17.02 K/UL — HIGH (ref 4.8–10.8)

## 2018-02-20 RX ORDER — MORPHINE SULFATE 50 MG/1
4 CAPSULE, EXTENDED RELEASE ORAL EVERY 4 HOURS
Qty: 0 | Refills: 0 | Status: DISCONTINUED | OUTPATIENT
Start: 2018-02-20 | End: 2018-02-20

## 2018-02-20 RX ORDER — MORPHINE SULFATE 50 MG/1
2 CAPSULE, EXTENDED RELEASE ORAL EVERY 4 HOURS
Qty: 0 | Refills: 0 | Status: DISCONTINUED | OUTPATIENT
Start: 2018-02-20 | End: 2018-02-22

## 2018-02-20 RX ORDER — MORPHINE SULFATE 50 MG/1
4 CAPSULE, EXTENDED RELEASE ORAL EVERY 6 HOURS
Qty: 0 | Refills: 0 | Status: DISCONTINUED | OUTPATIENT
Start: 2018-02-20 | End: 2018-02-20

## 2018-02-20 RX ORDER — HYDROMORPHONE HYDROCHLORIDE 2 MG/ML
0.5 INJECTION INTRAMUSCULAR; INTRAVENOUS; SUBCUTANEOUS
Qty: 0 | Refills: 0 | Status: DISCONTINUED | OUTPATIENT
Start: 2018-02-20 | End: 2018-02-20

## 2018-02-20 RX ORDER — AMPICILLIN SODIUM AND SULBACTAM SODIUM 250; 125 MG/ML; MG/ML
3 INJECTION, POWDER, FOR SUSPENSION INTRAMUSCULAR; INTRAVENOUS EVERY 6 HOURS
Qty: 0 | Refills: 0 | Status: DISCONTINUED | OUTPATIENT
Start: 2018-02-20 | End: 2018-02-22

## 2018-02-20 RX ORDER — SODIUM CHLORIDE 9 MG/ML
1000 INJECTION INTRAMUSCULAR; INTRAVENOUS; SUBCUTANEOUS
Qty: 0 | Refills: 0 | Status: DISCONTINUED | OUTPATIENT
Start: 2018-02-20 | End: 2018-02-20

## 2018-02-20 RX ORDER — OXYCODONE AND ACETAMINOPHEN 5; 325 MG/1; MG/1
2 TABLET ORAL EVERY 6 HOURS
Qty: 0 | Refills: 0 | Status: DISCONTINUED | OUTPATIENT
Start: 2018-02-20 | End: 2018-02-22

## 2018-02-20 RX ADMIN — MORPHINE SULFATE 2 MILLIGRAM(S): 50 CAPSULE, EXTENDED RELEASE ORAL at 22:40

## 2018-02-20 RX ADMIN — HYDROMORPHONE HYDROCHLORIDE 0.5 MILLIGRAM(S): 2 INJECTION INTRAMUSCULAR; INTRAVENOUS; SUBCUTANEOUS at 01:44

## 2018-02-20 RX ADMIN — AMPICILLIN SODIUM AND SULBACTAM SODIUM 200 GRAM(S): 250; 125 INJECTION, POWDER, FOR SUSPENSION INTRAMUSCULAR; INTRAVENOUS at 23:53

## 2018-02-20 RX ADMIN — AMPICILLIN SODIUM AND SULBACTAM SODIUM 200 GRAM(S): 250; 125 INJECTION, POWDER, FOR SUSPENSION INTRAMUSCULAR; INTRAVENOUS at 13:19

## 2018-02-20 RX ADMIN — AMPICILLIN SODIUM AND SULBACTAM SODIUM 200 GRAM(S): 250; 125 INJECTION, POWDER, FOR SUSPENSION INTRAMUSCULAR; INTRAVENOUS at 06:13

## 2018-02-20 RX ADMIN — OXYCODONE AND ACETAMINOPHEN 1 TABLET(S): 5; 325 TABLET ORAL at 21:35

## 2018-02-20 RX ADMIN — OXYCODONE AND ACETAMINOPHEN 2 TABLET(S): 5; 325 TABLET ORAL at 15:14

## 2018-02-20 RX ADMIN — AMPICILLIN SODIUM AND SULBACTAM SODIUM 200 GRAM(S): 250; 125 INJECTION, POWDER, FOR SUSPENSION INTRAMUSCULAR; INTRAVENOUS at 18:10

## 2018-02-20 RX ADMIN — HYDROMORPHONE HYDROCHLORIDE 0.5 MILLIGRAM(S): 2 INJECTION INTRAMUSCULAR; INTRAVENOUS; SUBCUTANEOUS at 06:12

## 2018-02-20 RX ADMIN — OXYCODONE AND ACETAMINOPHEN 2 TABLET(S): 5; 325 TABLET ORAL at 22:08

## 2018-02-20 RX ADMIN — HYDROMORPHONE HYDROCHLORIDE 0.5 MILLIGRAM(S): 2 INJECTION INTRAMUSCULAR; INTRAVENOUS; SUBCUTANEOUS at 00:20

## 2018-02-20 NOTE — PROGRESS NOTE ADULT - SUBJECTIVE AND OBJECTIVE BOX
Oral surgery resident examined the patient. Continue non-chew diet as tolerated. Continue antibiotics as per I.D. Patient will be transported to Dental clinic at 9:15am for panorex x-ray.

## 2018-02-20 NOTE — PROGRESS NOTE ADULT - ASSESSMENT
22/M with no significant medical hx, p/w swelling and erythema and tenderness to left mandible with difficulty swallowing. Found to have fever of 101.6F, tachy to 113, with a WBC 14K. Admitted to ICU for sepsis 2/2 mandibular abcess with extension to upper airway. Stabilized and downgraded to floor for further medical management.     # Sepsis 2/2 Mandibular abcess with extension to upper airway.  - Started on non chew soft diet   - OMFS took patient for I&D on 2/19/2018  - s/p PICC line insertion, awaiting wound culture results to determine antibiotic therapy   - Pain control     DVT ppx  OOBTC    Full Code

## 2018-02-20 NOTE — PROGRESS NOTE ADULT - SUBJECTIVE AND OBJECTIVE BOX
ESTER GLASGOW [id number]    Hospitalist Note  23yo M with no past medical history admitted to [facility] for worsening neck swelling/dysphagia secondary to mandibular abscess status post trauma/mandibular fracture.    Overnight events/Updates: status post drainage and tooth extraction x2 by OMFS.  Infectious Disease recommended treatment with IV antibiotics x6 weeks.    Vital Signs Last 24 Hrs  T(C): 37.6 (20 Feb 2018 08:00), Max: 37.6 (20 Feb 2018 08:00)  T(F): 99.6 (20 Feb 2018 08:00), Max: 99.6 (20 Feb 2018 08:00)  HR: 48 (20 Feb 2018 08:00) (48 - 110)  BP: 125/59 (20 Feb 2018 08:00) (114/76 - 165/83)  BP(mean): --  RR: 18 (20 Feb 2018 08:00) (15 - 20)  SpO2: 97% (20 Feb 2018 00:35) (97% - 100%)    Physical Examination:  General: AAO x 3  HEENT: PERRLA, EOMI,  left sided jaw swelling and tenderness  CV= S1 & S2 appreciated  Lungs= CTA BL  Abdominal Examination= + BS, Soft, NT/ND  Extremity Examination= No C/C/E    ROS: No chest pain, no shortness of breath.  All other systems reviewed and are within normal limits except for the complaints in the HPI.    MEDICATIONS  (STANDING):  ampicillin/sulbactam  IVPB 3 Gram(s) IV Intermittent every 6 hours    MEDICATIONS  (PRN):  oxyCODONE    5 mG/acetaminophen 325 mG 2 Tablet(s) Oral every 6 hours PRN Moderate Pain (4 - 6)                            9.2    17.02 )-----------( 357      ( 20 Feb 2018 07:31 )             28.5     02-20    133<L>  |  100  |  13  ----------------------------<  104  4.2   |  26  |  0.8    Ca    8.8      20 Feb 2018 07:31  Phos  3.8     02-19  Mg     2.3     02-19    TPro  7.0  /  Alb  3.5  /  TBili  0.7  /  DBili  x   /  AST  20  /  ALT  20  /  AlkPhos  68  02-19      Case discussed with housestaff & family  JYOTHI Campoverde [id number]

## 2018-02-20 NOTE — PROGRESS NOTE ADULT - SUBJECTIVE AND OBJECTIVE BOX
PEE, BILAL  22y, Male  22M with 3 week old b/l mandible fracture secondarily infected, admitted to medicine for IV abx. Take this evening to the OR for extarction of teeth #17, 18 and ORIF of elft mandibular fracture. Patient extubation and transferred to B    OVERNIGHT EVENTS:    No fevers, has discomfort at surgical site    VITALS:  T(F): 99.6, Max: 99.6 (02-20-18 @ 08:00)  HR: 48  BP: 125/59  RR: 18Vital Signs Last 24 Hrs  T(C): 37.6 (20 Feb 2018 08:00), Max: 37.6 (20 Feb 2018 08:00)  T(F): 99.6 (20 Feb 2018 08:00), Max: 99.6 (20 Feb 2018 08:00)  HR: 48 (20 Feb 2018 08:00) (48 - 110)  BP: 125/59 (20 Feb 2018 08:00) (114/76 - 165/83)  BP(mean): --  RR: 18 (20 Feb 2018 08:00) (15 - 20)  SpO2: 97% (20 Feb 2018 00:35) (97% - 100%)    TESTS & MEASUREMENTS:                        9.2    17.02 )-----------( 357      ( 20 Feb 2018 07:31 )             28.5     02-20    133<L>  |  100  |  13  ----------------------------<  104  4.2   |  26  |  0.8    Ca    8.8      20 Feb 2018 07:31  Phos  3.8     02-19  Mg     2.3     02-19    TPro  7.0  /  Alb  3.5  /  TBili  0.7  /  DBili  x   /  AST  20  /  ALT  20  /  AlkPhos  68  02-19    LIVER FUNCTIONS - ( 19 Feb 2018 07:56 )  Alb: 3.5 g/dL / Pro: 7.0 g/dL / ALK PHOS: 68 U/L / ALT: 20 U/L / AST: 20 U/L / GGT: x             Culture - Blood (collected 02-18-18 @ 01:05)  Source: .Blood Blood-Peripheral  Preliminary Report (02-19-18 @ 11:01):    No growth to date.    Culture - Blood (collected 02-18-18 @ 01:03)  Source: .Blood Blood-Peripheral  Preliminary Report (02-19-18 @ 11:01):    No growth to date.            RADIOLOGY & ADDITIONAL TESTS:    ANTIBIOTICS:  ampicillin/sulbactam  IVPB 3 Gram(s) IV Intermittent every 6 hours

## 2018-02-20 NOTE — PROGRESS NOTE ADULT - SUBJECTIVE AND OBJECTIVE BOX
SUBJECTIVE:    Patient is a 22y old Male who presents with a chief complaint of Jaw swelling and difficulty breathing (18 Feb 2018 03:32)    Currently admitted to medicine with the primary diagnosis of Mandibular abscess     Today is hospital day 2d. This afternoon he is resting comfortably in bed and reports no new issues or overnight events. He is now s/p PICC line insertion by IR     PAST MEDICAL & SURGICAL HISTORY  No pertinent past medical history  No significant past surgical history    SOCIAL HISTORY:  Negative for smoking/alcohol/drug use.     ALLERGIES:  No Known Drug Allergies  pork (Unknown)    MEDICATIONS:  STANDING MEDICATIONS  ampicillin/sulbactam  IVPB 3 Gram(s) IV Intermittent every 6 hours    PRN MEDICATIONS  morphine  - Injectable 2 milliGRAM(s) IV Push every 4 hours PRN  oxyCODONE    5 mG/acetaminophen 325 mG 2 Tablet(s) Oral every 6 hours PRN    VITALS:   T(F): 99.6  HR: 48  BP: 125/59  RR: 18  SpO2: 97%    LABS:                        9.2    17.02 )-----------( 357      ( 20 Feb 2018 07:31 )             28.5     02-20    133<L>  |  100  |  13  ----------------------------<  104  4.2   |  26  |  0.8    Ca    8.8      20 Feb 2018 07:31  Phos  3.8     02-19  Mg     2.3     02-19    TPro  7.0  /  Alb  3.5  /  TBili  0.7  /  DBili  x   /  AST  20  /  ALT  20  /  AlkPhos  68  02-19    PT/INR - ( 19 Feb 2018 07:56 )   PT: 13.10 sec;   INR: 1.21 ratio         PTT - ( 19 Feb 2018 07:56 )  PTT:36.1 sec          Culture - Blood (collected 18 Feb 2018 01:05)  Source: .Blood Blood-Peripheral  Preliminary Report (19 Feb 2018 11:01):    No growth to date.    Culture - Blood (collected 18 Feb 2018 01:03)  Source: .Blood Blood-Peripheral  Preliminary Report (19 Feb 2018 11:01):    No growth to date.          RADIOLOGY:    PHYSICAL EXAM:  GEN: No acute distress  LUNGS: Clear to auscultation bilaterally   HEART: S1/S2 present. RRR.   ABD: Soft, non-tender, non-distended. Bowel sounds present  EXT: NC/NC/NE/2+PP/ERNANDEZ  NEURO: AAOX3

## 2018-02-20 NOTE — PROGRESS NOTE ADULT - SUBJECTIVE AND OBJECTIVE BOX
Patient presented for follow-up as requested by Dr. Beavers after OR surgery for bilateral mandible fracture and abscess of lower left posterior with Dr. Sotelo and Dr. Beavers. Took 1 panoramic x-ray. Patients infection has improved and patient is feeling better. Sent information to Dr. Beavers along with Dr. Sotelo. Patient will be rounded by Dr. Sotelo and Dr. Beavers.  Patient may be discharged if patient feels well.

## 2018-02-20 NOTE — PROGRESS NOTE ADULT - ASSESSMENT
21yo M with no past medical history admitted to [facility] for worsening neck swelling/dysphagia secondary to mandibular abscess status post trauma/mandibular fracture.  Neck swelling/dysphagia secondary to mandibular abscess: status post drainage and tooth extraction by OMFS.  His case was discussed with Infectious Disease this afternoon.  Status post PICC line placement.  Continue supportive care with IV Unasyn and follow-up wound cultures.  The pt will likely need IV antibiotics upon discharge.    Continue Decadron and Percocet PRN for pain.  Morphine was discontinued.  Repeat BMP and CBC tomorrow.  GI/DVT prophylaxis

## 2018-02-21 LAB
ANION GAP SERPL CALC-SCNC: 5 MMOL/L — LOW (ref 7–14)
BUN SERPL-MCNC: 12 MG/DL — SIGNIFICANT CHANGE UP (ref 10–20)
CALCIUM SERPL-MCNC: 9.1 MG/DL — SIGNIFICANT CHANGE UP (ref 8.5–10.1)
CHLORIDE SERPL-SCNC: 103 MMOL/L — SIGNIFICANT CHANGE UP (ref 98–110)
CO2 SERPL-SCNC: 30 MMOL/L — SIGNIFICANT CHANGE UP (ref 17–32)
CREAT SERPL-MCNC: 0.7 MG/DL — SIGNIFICANT CHANGE UP (ref 0.7–1.5)
GLUCOSE SERPL-MCNC: 83 MG/DL — SIGNIFICANT CHANGE UP (ref 70–110)
HCT VFR BLD CALC: 33.5 % — LOW (ref 42–52)
HGB BLD-MCNC: 10.8 G/DL — LOW (ref 14–18)
MCHC RBC-ENTMCNC: 23 PG — LOW (ref 27–31)
MCHC RBC-ENTMCNC: 32.2 G/DL — SIGNIFICANT CHANGE UP (ref 32–37)
MCV RBC AUTO: 71.3 FL — LOW (ref 80–94)
NRBC # BLD: 0 /100 WBCS — SIGNIFICANT CHANGE UP (ref 0–0)
PLATELET # BLD AUTO: 391 K/UL — SIGNIFICANT CHANGE UP (ref 130–400)
POTASSIUM SERPL-MCNC: 3.8 MMOL/L — SIGNIFICANT CHANGE UP (ref 3.5–5)
POTASSIUM SERPL-SCNC: 3.8 MMOL/L — SIGNIFICANT CHANGE UP (ref 3.5–5)
RBC # BLD: 4.7 M/UL — SIGNIFICANT CHANGE UP (ref 4.7–6.1)
RBC # FLD: 14.8 % — HIGH (ref 11.5–14.5)
SODIUM SERPL-SCNC: 138 MMOL/L — SIGNIFICANT CHANGE UP (ref 135–146)
SURGICAL PATHOLOGY STUDY: SIGNIFICANT CHANGE UP
WBC # BLD: 10.13 K/UL — SIGNIFICANT CHANGE UP (ref 4.8–10.8)
WBC # FLD AUTO: 10.13 K/UL — SIGNIFICANT CHANGE UP (ref 4.8–10.8)

## 2018-02-21 RX ADMIN — MORPHINE SULFATE 2 MILLIGRAM(S): 50 CAPSULE, EXTENDED RELEASE ORAL at 12:56

## 2018-02-21 RX ADMIN — MORPHINE SULFATE 2 MILLIGRAM(S): 50 CAPSULE, EXTENDED RELEASE ORAL at 07:12

## 2018-02-21 RX ADMIN — AMPICILLIN SODIUM AND SULBACTAM SODIUM 200 GRAM(S): 250; 125 INJECTION, POWDER, FOR SUSPENSION INTRAMUSCULAR; INTRAVENOUS at 23:59

## 2018-02-21 RX ADMIN — OXYCODONE AND ACETAMINOPHEN 2 TABLET(S): 5; 325 TABLET ORAL at 18:24

## 2018-02-21 RX ADMIN — OXYCODONE AND ACETAMINOPHEN 2 TABLET(S): 5; 325 TABLET ORAL at 17:54

## 2018-02-21 RX ADMIN — AMPICILLIN SODIUM AND SULBACTAM SODIUM 200 GRAM(S): 250; 125 INJECTION, POWDER, FOR SUSPENSION INTRAMUSCULAR; INTRAVENOUS at 17:49

## 2018-02-21 RX ADMIN — MORPHINE SULFATE 2 MILLIGRAM(S): 50 CAPSULE, EXTENDED RELEASE ORAL at 12:39

## 2018-02-21 RX ADMIN — AMPICILLIN SODIUM AND SULBACTAM SODIUM 200 GRAM(S): 250; 125 INJECTION, POWDER, FOR SUSPENSION INTRAMUSCULAR; INTRAVENOUS at 11:24

## 2018-02-21 RX ADMIN — AMPICILLIN SODIUM AND SULBACTAM SODIUM 200 GRAM(S): 250; 125 INJECTION, POWDER, FOR SUSPENSION INTRAMUSCULAR; INTRAVENOUS at 06:15

## 2018-02-21 NOTE — PROGRESS NOTE ADULT - SUBJECTIVE AND OBJECTIVE BOX
SUBJECTIVE:    Patient is a 22y old Male who presents with a chief complaint of Jaw swelling and difficulty breathing (18 Feb 2018 03:32)    Currently admitted to medicine with the primary diagnosis of Mandibular abscess     Today is hospital day 3d. This morning he is resting comfortably in bed and reports no new issues or overnight events.     PAST MEDICAL & SURGICAL HISTORY  No pertinent past medical history  No significant past surgical history    SOCIAL HISTORY:  Negative for smoking/alcohol/drug use.     ALLERGIES:  No Known Drug Allergies  pork (Unknown)    MEDICATIONS:  STANDING MEDICATIONS  ampicillin/sulbactam  IVPB 3 Gram(s) IV Intermittent every 6 hours    PRN MEDICATIONS  morphine  - Injectable 2 milliGRAM(s) IV Push every 4 hours PRN  oxyCODONE    5 mG/acetaminophen 325 mG 2 Tablet(s) Oral every 6 hours PRN    VITALS:   T(F): 97  HR: 84  BP: 121/72  RR: 18  SpO2: --    LABS:                        10.8   10.13 )-----------( 391      ( 21 Feb 2018 07:11 )             33.5     02-21    138  |  103  |  12  ----------------------------<  83  3.8   |  30  |  0.7    Ca    9.1      21 Feb 2018 07:11      RADIOLOGY:    PHYSICAL EXAM:  GEN: No acute distress  LUNGS: Clear to auscultation bilaterally   HEART: S1/S2 present. RRR.   ABD: Soft, non-tender, non-distended. Bowel sounds present  EXT: NC/NC/NE/2+PP/ERNANDEZ  NEURO: AAOX3 SUBJECTIVE:    Patient is a 22y old Male who presents with a chief complaint of Jaw swelling and difficulty breathing (18 Feb 2018 03:32)    Currently admitted to medicine with the primary diagnosis of Mandibular abscess     Today is hospital day 3d. This morning he is resting comfortably in bed and reports no new issues or overnight events.     PAST MEDICAL & SURGICAL HISTORY  No pertinent past medical history  No significant past surgical history    SOCIAL HISTORY:  Negative for smoking/alcohol/drug use.     ALLERGIES:  No Known Drug Allergies  pork (Unknown)    MEDICATIONS:  STANDING MEDICATIONS  ampicillin/sulbactam  IVPB 3 Gram(s) IV Intermittent every 6 hours    PRN MEDICATIONS  morphine  - Injectable 2 milliGRAM(s) IV Push every 4 hours PRN  oxyCODONE    5 mG/acetaminophen 325 mG 2 Tablet(s) Oral every 6 hours PRN    VITALS:   T(F): 97  HR: 84  BP: 121/72  RR: 18  SpO2: --    LABS:                        10.8   10.13 )-----------( 391      ( 21 Feb 2018 07:11 )             33.5     02-21    138  |  103  |  12  ----------------------------<  83  3.8   |  30  |  0.7    Ca    9.1      21 Feb 2018 07:11      RADIOLOGY:    PHYSICAL EXAM:  GEN: No acute distress  LUNGS: Clear to auscultation bilaterally   HEART: S1/S2 present. RRR.   ABD: Soft, non-tender, non-distended. Bowel sounds present  EXT: NC/NC/NE/2+PP/ERNANDEZ, swelling to left jaw improved   NEURO: AAOX3

## 2018-02-21 NOTE — PROGRESS NOTE ADULT - ASSESSMENT
22/M with no significant medical hx, p/w swelling and erythema and tenderness to left mandible with difficulty swallowing. Found to have fever of 101.6F, tachy to 113, with a WBC 14K. Admitted to ICU for sepsis 2/2 mandibular abcess with extension to upper airway. Stabilized and downgraded to floor for further medical management.     # Sepsis 2/2 Mandibular abcess with extension to upper airway.  - Tolerating non chew soft diet   - s/p I&D on 2/19/2018   - s/p PICC line insertion, awaiting wound culture results to determine antibiotic therapy   - Pain control     DVT ppx  OOBTC    Full Code

## 2018-02-21 NOTE — PROGRESS NOTE ADULT - SUBJECTIVE AND OBJECTIVE BOX
ESTER GLASGOW [id number]    Hospitalist Note  21yo M with no past medical history admitted to [facility] for worsening neck swelling/dysphagia secondary to mandibular abscess status post trauma/mandibular fracture.    Overnight events/Updates: status post drainage and tooth extraction x2 by OMFS.  Infectious Disease recommended treatment with IV antibiotics x6 weeks.  We are currently awaiting results from wound cultures prior to finalizing a long-term abx regimen.    Vital Signs Last 24 Hrs  T(C): 36.1 (21 Feb 2018 07:30), Max: 36.9 (20 Feb 2018 23:55)  T(F): 97 (21 Feb 2018 07:30), Max: 98.4 (20 Feb 2018 23:55)  HR: 84 (21 Feb 2018 07:30) (61 - 84)  BP: 121/72 (21 Feb 2018 07:30) (118/77 - 122/84)  BP(mean): --  RR: 18 (21 Feb 2018 07:30) (18 - 18)  SpO2: --    Physical Examination:  General: AAO x 3  HEENT: PERRLA, EOMI,  left sided jaw swelling has improved from admission  CV= S1 & S2 appreciated  Lungs=CTA BL  Abdominal Examination= + BS, Soft, NT/ND  Extremity Examination= No C/C/E    ROS: No chest pain, no shortness of breath.  All other systems reviewed and are within normal limits except for the complaints in the HPI.    MEDICATIONS  (STANDING):  ampicillin/sulbactam  IVPB 3 Gram(s) IV Intermittent every 6 hours    MEDICATIONS  (PRN):  morphine  - Injectable 2 milliGRAM(s) IV Push every 4 hours PRN breakthrough pain  oxyCODONE    5 mG/acetaminophen 325 mG 2 Tablet(s) Oral every 6 hours PRN Moderate Pain (4 - 6)                            10.8   10.13 )-----------( 391      ( 21 Feb 2018 07:11 )             33.5     02-21    138  |  103  |  12  ----------------------------<  83  3.8   |  30  |  0.7    Ca    9.1      21 Feb 2018 07:11        Case discussed with housestaff & family  JYOTHI Campoverde [id number]

## 2018-02-21 NOTE — PROGRESS NOTE ADULT - ASSESSMENT
23yo M with no past medical history admitted to [facility] for worsening neck swelling/dysphagia secondary to mandibular abscess status post trauma/mandibular fracture.  Neck swelling/dysphagia secondary to mandibular abscess: status post drainage and tooth extraction by OMFS.  Status post PICC line placement.  Awaiting results from wound cultures prior to finalizing outpatient antibiotics.  Continue Percocet PRN for pain.  Decadron was discontinued.   GI/DVT prophylaxis

## 2018-02-22 ENCOUNTER — TRANSCRIPTION ENCOUNTER (OUTPATIENT)
Age: 23
End: 2018-02-22

## 2018-02-22 VITALS
HEART RATE: 71 BPM | SYSTOLIC BLOOD PRESSURE: 118 MMHG | TEMPERATURE: 97 F | RESPIRATION RATE: 16 BRPM | DIASTOLIC BLOOD PRESSURE: 77 MMHG

## 2018-02-22 PROBLEM — Z00.00 ENCOUNTER FOR PREVENTIVE HEALTH EXAMINATION: Status: ACTIVE | Noted: 2018-02-22

## 2018-02-22 LAB
ANION GAP SERPL CALC-SCNC: 6 MMOL/L — LOW (ref 7–14)
BUN SERPL-MCNC: 14 MG/DL — SIGNIFICANT CHANGE UP (ref 10–20)
CALCIUM SERPL-MCNC: 9.4 MG/DL — SIGNIFICANT CHANGE UP (ref 8.5–10.1)
CHLORIDE SERPL-SCNC: 101 MMOL/L — SIGNIFICANT CHANGE UP (ref 98–110)
CO2 SERPL-SCNC: 29 MMOL/L — SIGNIFICANT CHANGE UP (ref 17–32)
CREAT SERPL-MCNC: 0.8 MG/DL — SIGNIFICANT CHANGE UP (ref 0.7–1.5)
GLUCOSE SERPL-MCNC: 83 MG/DL — SIGNIFICANT CHANGE UP (ref 70–110)
HCT VFR BLD CALC: 34.1 % — LOW (ref 42–52)
HGB BLD-MCNC: 10.9 G/DL — LOW (ref 14–18)
MCHC RBC-ENTMCNC: 22.7 PG — LOW (ref 27–31)
MCHC RBC-ENTMCNC: 32 G/DL — SIGNIFICANT CHANGE UP (ref 32–37)
MCV RBC AUTO: 70.9 FL — LOW (ref 80–94)
NRBC # BLD: 0 /100 WBCS — SIGNIFICANT CHANGE UP (ref 0–0)
PLATELET # BLD AUTO: 407 K/UL — HIGH (ref 130–400)
POTASSIUM SERPL-MCNC: 3.8 MMOL/L — SIGNIFICANT CHANGE UP (ref 3.5–5)
POTASSIUM SERPL-SCNC: 3.8 MMOL/L — SIGNIFICANT CHANGE UP (ref 3.5–5)
RBC # BLD: 4.81 M/UL — SIGNIFICANT CHANGE UP (ref 4.7–6.1)
RBC # FLD: 14.8 % — HIGH (ref 11.5–14.5)
SODIUM SERPL-SCNC: 136 MMOL/L — SIGNIFICANT CHANGE UP (ref 135–146)
WBC # BLD: 8.58 K/UL — SIGNIFICANT CHANGE UP (ref 4.8–10.8)
WBC # FLD AUTO: 8.58 K/UL — SIGNIFICANT CHANGE UP (ref 4.8–10.8)

## 2018-02-22 RX ORDER — ACETAMINOPHEN 500 MG
1 TABLET ORAL
Qty: 0 | Refills: 0 | COMMUNITY

## 2018-02-22 RX ORDER — METRONIDAZOLE 500 MG
1 TABLET ORAL
Qty: 84 | Refills: 0 | OUTPATIENT
Start: 2018-02-22 | End: 2018-03-21

## 2018-02-22 RX ORDER — CEFTRIAXONE 500 MG/1
2 INJECTION, POWDER, FOR SOLUTION INTRAMUSCULAR; INTRAVENOUS ONCE
Qty: 0 | Refills: 0 | Status: COMPLETED | OUTPATIENT
Start: 2018-02-22 | End: 2018-02-22

## 2018-02-22 RX ORDER — CEFTRIAXONE 500 MG/1
2 INJECTION, POWDER, FOR SOLUTION INTRAMUSCULAR; INTRAVENOUS
Qty: 0 | Refills: 0 | COMMUNITY
Start: 2018-02-22 | End: 2018-03-22

## 2018-02-22 RX ADMIN — OXYCODONE AND ACETAMINOPHEN 2 TABLET(S): 5; 325 TABLET ORAL at 03:27

## 2018-02-22 RX ADMIN — CEFTRIAXONE 100 GRAM(S): 500 INJECTION, POWDER, FOR SOLUTION INTRAMUSCULAR; INTRAVENOUS at 11:51

## 2018-02-22 RX ADMIN — AMPICILLIN SODIUM AND SULBACTAM SODIUM 200 GRAM(S): 250; 125 INJECTION, POWDER, FOR SUSPENSION INTRAMUSCULAR; INTRAVENOUS at 06:40

## 2018-02-22 RX ADMIN — HYDROMORPHONE HYDROCHLORIDE 0.5 MILLIGRAM(S): 2 INJECTION INTRAMUSCULAR; INTRAVENOUS; SUBCUTANEOUS at 07:36

## 2018-02-22 RX ADMIN — OXYCODONE AND ACETAMINOPHEN 2 TABLET(S): 5; 325 TABLET ORAL at 06:44

## 2018-02-22 RX ADMIN — OXYCODONE AND ACETAMINOPHEN 2 TABLET(S): 5; 325 TABLET ORAL at 07:36

## 2018-02-22 RX ADMIN — OXYCODONE AND ACETAMINOPHEN 2 TABLET(S): 5; 325 TABLET ORAL at 00:01

## 2018-02-22 NOTE — PROGRESS NOTE ADULT - SUBJECTIVE AND OBJECTIVE BOX
ESTER GLASGOW  22y, Male      OVERNIGHT EVENTS:    no fevers, no new complaints      VITALS:  T(F): 96.8, Max: 98.6 (02-21-18 @ 16:30)  HR: 71  BP: 118/77  RR: 16Vital Signs Last 24 Hrs  T(C): 36 (22 Feb 2018 07:20), Max: 37 (21 Feb 2018 16:30)  T(F): 96.8 (22 Feb 2018 07:20), Max: 98.6 (21 Feb 2018 16:30)  HR: 71 (22 Feb 2018 07:20) (71 - 83)  BP: 118/77 (22 Feb 2018 07:20) (115/64 - 126/72)  BP(mean): --  RR: 16 (22 Feb 2018 07:20) (16 - 20)  SpO2: --    TESTS & MEASUREMENTS:                        10.8   10.13 )-----------( 391      ( 21 Feb 2018 07:11 )             33.5     02-21    138  |  103  |  12  ----------------------------<  83  3.8   |  30  |  0.7    Ca    9.1      21 Feb 2018 07:11          Culture - Abscess with Gram Stain (collected 02-20-18 @ 08:18)  Source: .Abscess None  Preliminary Report (02-22-18 @ 08:59):    Rare Coag Negative Staphylococcus Susceptibilites not performed.    Growth in fluid media only Alpha hemolytic strep    Culture - Blood (collected 02-18-18 @ 01:05)  Source: .Blood Blood-Peripheral  Preliminary Report (02-19-18 @ 11:01):    No growth to date.    Culture - Blood (collected 02-18-18 @ 01:03)  Source: .Blood Blood-Peripheral  Preliminary Report (02-19-18 @ 11:01):    No growth to date.            RADIOLOGY & ADDITIONAL TESTS:    ANTIBIOTICS:  ampicillin/sulbactam  IVPB 3 Gram(s) IV Intermittent every 6 hours

## 2018-02-22 NOTE — DISCHARGE NOTE ADULT - PLAN OF CARE
Patient with successful I&D by OMFS, cultures sent. Discharge to home with PICC and 4 weeks of Rocephin 2 gram once a day (per ID), Flagyl 500mg PO q8h for 4 weeks followed by Augmentin 875 q12h for 2 additional weeks Monitor and treat adequately Status post incission and drainage by OMFS.  Discharge to home with PICC and 4 weeks of Rocephin 2 gram once a day (per ID), Flagyl 500mg PO q8h for 4 weeks followed by Augmentin 875 q12h for 2 additional weeks

## 2018-02-22 NOTE — PROGRESS NOTE ADULT - ASSESSMENT
left mandibular abscess/ chronic OM:  cultures CNS, alpha hemolytic strep  Rocephin 2gm q24h and Flagyl 500mg po q8h for 4 weeks then change iv to po Augmentin 875 mg q12h for 2 more weeks    f/u with ID in Janine    recall prn please

## 2018-02-22 NOTE — PROGRESS NOTE ADULT - PROVIDER SPECIALTY LIST ADULT
Dental
Dental
ENT
Hospitalist
Infectious Disease
Internal Medicine
OMFS
OMFS
Dental

## 2018-02-22 NOTE — PROGRESS NOTE ADULT - SUBJECTIVE AND OBJECTIVE BOX
ESTER GLASGOW [id number]    Hospitalist Note  21yo M with no past medical history admitted to [facility] for worsening neck swelling/dysphagia secondary to mandibular abscess status post trauma/mandibular fracture.    Overnight events/Updates: status post drainage and tooth extraction x2 by OMFS.  Infectious Disease follow-up appreciated.  Dr. Kilpatrick recommended treatment with IV Rocephin and Flagyl x4 weeks, followed by Augmentin 875mg PO q12 x2 more weeks.    Vital Signs Last 24 Hrs  T(C): 36 (22 Feb 2018 07:20), Max: 37 (21 Feb 2018 16:30)  T(F): 96.8 (22 Feb 2018 07:20), Max: 98.6 (21 Feb 2018 16:30)  HR: 71 (22 Feb 2018 07:20) (71 - 83)  BP: 118/77 (22 Feb 2018 07:20) (115/64 - 126/72)  BP(mean): --  RR: 16 (22 Feb 2018 07:20) (16 - 20)  SpO2: --    Physical Examination:  General: AAO x 3  HEENT: PERRLA, EOMI,  left sided jaw swelling has improved from admission  CV= S1 & S2 appreciated  Lungs=CTA BL  Abdominal Examination= + BS, Soft, NT/ND  Extremity Examination= No C/C/E    ROS: No chest pain, no shortness of breath.  All other systems reviewed and are within normal limits except for the complaints in the HPI.    MEDICATIONS  (STANDING):    MEDICATIONS  (PRN):  morphine  - Injectable 2 milliGRAM(s) IV Push every 4 hours PRN breakthrough pain  oxyCODONE    5 mG/acetaminophen 325 mG 2 Tablet(s) Oral every 6 hours PRN Moderate Pain (4 - 6)                            10.9   8.58  )-----------( 407      ( 22 Feb 2018 09:24 )             34.1     02-22    136  |  101  |  14  ----------------------------<  83  3.8   |  29  |  0.8    Ca    9.4      22 Feb 2018 09:24    Wound Cx: coagulase negative Staph      Case discussed with housestaff & family  JYOTHI Campoverde [id number]

## 2018-02-22 NOTE — DISCHARGE NOTE ADULT - ADDITIONAL INSTRUCTIONS
Please follow up with your primary medical physician and Oral Maxillofacial Surgery. You will take your Rocephin via the IV and Flagyl orally as instructed for a total of 4 weeks. Following this, you will take Augmentin orally as instructed for 2 weeks. Please follow up with an ID doctor on discharge

## 2018-02-22 NOTE — PROGRESS NOTE ADULT - SUBJECTIVE AND OBJECTIVE BOX
SUBJECTIVE:    Patient is a 22y old Male who presents with a chief complaint of Jaw swelling and difficulty breathing (22 Feb 2018 11:56)    Currently admitted to medicine with the primary diagnosis of Mandibular abscess     Today is hospital day 4d. This morning he is resting comfortably in bed and reports no new issues or overnight events. He is scheduled to go home today with IV antibiotics and VNS services. Patient is agreeable.     PAST MEDICAL & SURGICAL HISTORY  No pertinent past medical history  No significant past surgical history    SOCIAL HISTORY:  Negative for smoking/alcohol/drug use.     ALLERGIES:  No Known Drug Allergies  pork (Unknown)    MEDICATIONS:  STANDING MEDICATIONS    PRN MEDICATIONS  morphine  - Injectable 2 milliGRAM(s) IV Push every 4 hours PRN  oxyCODONE    5 mG/acetaminophen 325 mG 2 Tablet(s) Oral every 6 hours PRN    VITALS:   T(F): 96.8  HR: 71  BP: 118/77  RR: 16  SpO2: --    LABS:                        10.9   8.58  )-----------( 407      ( 22 Feb 2018 09:24 )             34.1     02-22    136  |  101  |  14  ----------------------------<  83  3.8   |  29  |  0.8    Ca    9.4      22 Feb 2018 09:24                Culture - Abscess with Gram Stain (collected 20 Feb 2018 08:18)  Source: .Abscess None  Preliminary Report (22 Feb 2018 08:59):    Rare Coag Negative Staphylococcus Susceptibilites not performed.    Growth in fluid media only Alpha hemolytic strep    RADIOLOGY:    PHYSICAL EXAM:  GEN: No acute distress  LUNGS: Clear to auscultation bilaterally   HEART: S1/S2 present. RRR.   ABD: Soft, non-tender, non-distended. Bowel sounds present  EXT: decreased swelling to left jaw   NEURO: AAOX3

## 2018-02-22 NOTE — DISCHARGE NOTE ADULT - CARE PROVIDER_API CALL
Amin, Mohsena F (MD), Infectious Disease; Internal Medicine  [address] [place] [address]  Phone: [phone]  Fax: [phone]    Henry Viramontes), Pediatrics  [address] [place] [address]  Phone: [phone]  Fax: [phone]

## 2018-02-22 NOTE — DISCHARGE NOTE ADULT - CARE PLAN
Principal Discharge DX:	Mandibular abscess  Goal:	Monitor and treat adequately  Assessment and plan of treatment:	Patient with successful I&D by OMFS, cultures sent. Discharge to home with PICC and 4 weeks of Rocephin 2 gram once a day (per ID), Flagyl 500mg PO q8h for 4 weeks followed by Augmentin 875 q12h for 2 additional weeks Principal Discharge DX:	Mandibular abscess  Goal:	Monitor and treat adequately  Assessment and plan of treatment:	Status post incission and drainage by OMFS.  Discharge to home with PICC and 4 weeks of Rocephin 2 gram once a day (per ID), Flagyl 500mg PO q8h for 4 weeks followed by Augmentin 875 q12h for 2 additional weeks

## 2018-02-22 NOTE — DISCHARGE NOTE ADULT - MEDICATION SUMMARY - MEDICATIONS TO TAKE
I will START or STAY ON the medications listed below when I get home from the hospital:    Flagyl 500 mg oral tablet  -- 1 tab(s) by mouth every 8 hours   -- Do not drink alcoholic beverages when taking this medication.  Finish all this medication unless otherwise directed by prescriber.  May discolor urine or feces.    -- Indication: For MANDIBULAR ABSCESS    Rocephin  -- 2 gram(s) intravenous once a day  -- Indication: For Mandibular abscess    amoxicillin-clavulanate 875 mg-125 mg oral tablet  -- 1 tab(s) by mouth every 12 hours   -- Finish all this medication unless otherwise directed by prescriber.  Take with food or milk.    -- Indication: For Mandibular abscess I will START or STAY ON the medications listed below when I get home from the hospital:    Flagyl 500 mg oral tablet  -- 1 tab(s) by mouth every 8 hours   -- Do not drink alcoholic beverages when taking this medication.  Finish all this medication unless otherwise directed by prescriber.  May discolor urine or feces.    -- Indication: For MANDIBULAR ABSCESS    Tylenol 500 mg oral tablet  -- 1 tab(s) by mouth 2 times a day  -- Indication: For Pain    Rocephin  -- 2 gram(s) intravenous once a day  -- Indication: For Mandibular abscess    amoxicillin-clavulanate 875 mg-125 mg oral tablet  -- 1 tab(s) by mouth every 12 hours   -- Finish all this medication unless otherwise directed by prescriber.  Take with food or milk.    -- Indication: For Mandibular abscess

## 2018-02-22 NOTE — DISCHARGE NOTE ADULT - PATIENT PORTAL LINK FT
Pt scheduled for primary care f/u, letter sent as reminder.    You can access the [facility], offered by [facility], by registering with the following website: [email or web address]

## 2018-02-22 NOTE — PROGRESS NOTE ADULT - ASSESSMENT
23yo M with no past medical history admitted to [facility] for worsening neck swelling/dysphagia secondary to mandibular abscess status post trauma/mandibular fracture.  Neck swelling/dysphagia secondary to mandibular abscess: status post drainage and tooth extraction by OMFS.  Status post PICC line placement.  Wound cultures were positive for Staph Epidermis.  See above for Infectious Disease recommendations. Continue Tylenol PRN for pain. Decadron was discontinued.   GI/DVT prophylaxis  Discharge home; time spent = 35 minutes
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
## 2018-02-23 LAB
CULTURE RESULTS: SIGNIFICANT CHANGE UP
CULTURE RESULTS: SIGNIFICANT CHANGE UP
SPECIMEN SOURCE: SIGNIFICANT CHANGE UP
SPECIMEN SOURCE: SIGNIFICANT CHANGE UP

## 2018-02-27 DIAGNOSIS — K01.1 IMPACTED TEETH: ICD-10-CM

## 2018-02-27 DIAGNOSIS — A41.9 SEPSIS, UNSPECIFIED ORGANISM: ICD-10-CM

## 2018-02-27 DIAGNOSIS — M60.9 MYOSITIS, UNSPECIFIED: ICD-10-CM

## 2018-02-27 DIAGNOSIS — S02.652A FRACTURE OF ANGLE OF LEFT MANDIBLE, INITIAL ENCOUNTER FOR CLOSED FRACTURE: ICD-10-CM

## 2018-02-27 DIAGNOSIS — E87.2 ACIDOSIS: ICD-10-CM

## 2018-02-27 DIAGNOSIS — M27.2 INFLAMMATORY CONDITIONS OF JAWS: ICD-10-CM

## 2018-02-27 DIAGNOSIS — N17.9 ACUTE KIDNEY FAILURE, UNSPECIFIED: ICD-10-CM

## 2018-02-27 DIAGNOSIS — S02.5XXA FRACTURE OF TOOTH (TRAUMATIC), INITIAL ENCOUNTER FOR CLOSED FRACTURE: ICD-10-CM

## 2018-02-27 DIAGNOSIS — K12.2 CELLULITIS AND ABSCESS OF MOUTH: ICD-10-CM
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
## 2018-02-27 DIAGNOSIS — K02.9 DENTAL CARIES, UNSPECIFIED: ICD-10-CM

## 2018-02-27 DIAGNOSIS — G93.41 METABOLIC ENCEPHALOPATHY: ICD-10-CM

## 2018-02-27 DIAGNOSIS — H66.92 OTITIS MEDIA, UNSPECIFIED, LEFT EAR: ICD-10-CM
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
## 2018-02-27 DIAGNOSIS — S02.601A FRACTURE OF UNSPECIFIED PART OF BODY OF RIGHT MANDIBLE, INITIAL ENCOUNTER FOR CLOSED FRACTURE: ICD-10-CM

## 2018-02-28 ENCOUNTER — OUTPATIENT (OUTPATIENT)
Dept: OUTPATIENT SERVICES | Facility: HOSPITAL | Age: 23
LOS: 1 days | Discharge: HOME | End: 2018-02-28

## 2018-03-01 DIAGNOSIS — S02.600G: ICD-10-CM

## 2018-03-07 DIAGNOSIS — B95.8 UNSPECIFIED STAPHYLOCOCCUS AS THE CAUSE OF DISEASES CLASSIFIED ELSEWHERE: ICD-10-CM

## 2019-03-25 NOTE — PRE-ANESTHESIA EVALUATION ADULT - BSA (M2)
1.57 Ftsg Text: The defect edges were debeveled with a #15 scalpel blade.  Given the location of the defect, shape of the defect and the proximity to free margins a full thickness skin graft was deemed most appropriate.  Using a sterile surgical marker, the primary defect shape was transferred to the donor site. The area thus outlined was incised deep to adipose tissue with a #15 scalpel blade.  The harvested graft was then trimmed of adipose tissue until only dermis and epidermis was left.  The skin margins of the secondary defect were undermined to an appropriate distance in all directions utilizing iris scissors.  The secondary defect was closed with interrupted buried subcutaneous sutures.  The skin edges were then re-apposed with running  sutures.  The skin graft was then placed in the primary defect and oriented appropriately.

## 2024-11-13 NOTE — PROGRESS NOTE ADULT - ASSESSMENT
22/M with no significant medical hx, p/w swelling and erythema and tenderness to left mandible with difficulty swallowing. Found to have fever of 101.6F, tachy to 113, with a WBC 14K. Admitted to ICU for sepsis 2/2 mandibular abcess with extension to upper airway. Stabilized and downgraded to floor for further medical management.     # Sepsis 2/2 Mandibular abcess with extension to upper airway.  - Tolerating non chew soft diet   - s/p I&D on 2/19/2018   - s/p PICC line insertion, discharge to home on 2 g rocephin IV and flagyl 500mg po q8h for 4 weeks followed by augmentin 875mg q12h for 2 weeks   - Pain control     DVT ppx  OOBTC    Full Code blood pressure control/ breastfeeding